# Patient Record
Sex: MALE | Race: BLACK OR AFRICAN AMERICAN | Employment: FULL TIME | URBAN - METROPOLITAN AREA
[De-identification: names, ages, dates, MRNs, and addresses within clinical notes are randomized per-mention and may not be internally consistent; named-entity substitution may affect disease eponyms.]

---

## 2021-04-21 ENCOUNTER — HOSPITAL ENCOUNTER (EMERGENCY)
Age: 41
Discharge: HOME | End: 2021-04-21
Attending: EMERGENCY MEDICINE
Payer: OTHER MISCELLANEOUS

## 2021-04-21 VITALS
TEMPERATURE: 98.1 F | RESPIRATION RATE: 16 BRPM | DIASTOLIC BLOOD PRESSURE: 82 MMHG | WEIGHT: 140 LBS | HEIGHT: 71 IN | SYSTOLIC BLOOD PRESSURE: 127 MMHG | HEART RATE: 89 BPM | BODY MASS INDEX: 19.6 KG/M2 | OXYGEN SATURATION: 96 %

## 2021-04-21 DIAGNOSIS — S80.12XA CONTUSION OF LEFT LOWER EXTREMITY, INITIAL ENCOUNTER: Primary | ICD-10-CM

## 2021-04-21 DIAGNOSIS — S80.812A ABRASION OF LEFT LOWER EXTREMITY, INITIAL ENCOUNTER: ICD-10-CM

## 2021-04-21 PROCEDURE — 99283 EMERGENCY DEPT VISIT LOW MDM: CPT

## 2021-04-21 PROCEDURE — 74011250637 HC RX REV CODE- 250/637: Performed by: EMERGENCY MEDICINE

## 2021-04-21 RX ORDER — IBUPROFEN 400 MG/1
400 TABLET ORAL
Status: COMPLETED | OUTPATIENT
Start: 2021-04-21 | End: 2021-04-21

## 2021-04-21 RX ADMIN — IBUPROFEN 400 MG: 400 TABLET, FILM COATED ORAL at 00:52

## 2021-04-21 NOTE — ED PROVIDER NOTES
EMERGENCY DEPARTMENT HISTORY AND PHYSICAL EXAM      Date: 4/21/2021  Patient Name: Michelle Villela    History of Presenting Illness     Chief Complaint   Patient presents with    Leg Pain       History Provided By: Patient    HPI: Michelle Villela, 47 y.o. male with a past medical history significant No significant past medical history presents to the ED with cc of Left knee pain after someone ran into him with a forklift at work 30 minutes ago. States he had prior injury at same site 2 months ago with normal xrays. Ambulated here, denies other injuries. No focal weakness or numbness. PCP: Ronen García MD    No current facility-administered medications on file prior to encounter. No current outpatient medications on file prior to encounter. Past History     Past Medical History: States tetanus UTD  History reviewed. No pertinent past medical history. Past Surgical History:  History reviewed. No pertinent surgical history. Family History:  History reviewed. No pertinent family history. Social History:  Social History     Tobacco Use    Smoking status: Never Smoker    Smokeless tobacco: Never Used   Substance Use Topics    Alcohol use: Not Currently    Drug use: Yes     Types: Marijuana     Comment: daily       Allergies:  No Known Allergies      Review of Systems   Review of Systems   Cardiovascular: Negative for chest pain. Gastrointestinal: Negative for abdominal pain. Musculoskeletal: Negative for back pain and neck pain. Neurological: Negative for weakness and numbness. Hematological: Does not bruise/bleed easily. All other systems reviewed and are negative. Physical Exam   Physical Exam  Vitals signs and nursing note reviewed. Constitutional:       Appearance: Normal appearance. He is normal weight. HENT:      Head: Normocephalic. Eyes:      General: No scleral icterus. Neck:      Musculoskeletal: Normal range of motion.    Cardiovascular:      Rate and Rhythm: Normal rate and regular rhythm. Pulses: Normal pulses. Pulmonary:      Effort: Pulmonary effort is normal.   Musculoskeletal: Normal range of motion. Comments: Points to lateral, posterior left knee as site of pain. No deformity or contusion, no edema or warmth. No bony tenderness to palpation. Skin:     General: Skin is warm and dry. Comments: Superficial abrasion, proximal lateral left leg. No bleeding. Neurological:      General: No focal deficit present. Mental Status: He is alert. Sensory: No sensory deficit. Motor: No weakness. Gait: Gait normal.   Psychiatric:         Mood and Affect: Mood normal.         Diagnostic Study Results     Labs -   No results found for this or any previous visit (from the past 12 hour(s)). Radiologic Studies -   No orders to display     CT Results  (Last 48 hours)    None        CXR Results  (Last 48 hours)    None            Medical Decision Making   I am the first provider for this patient. I reviewed the vital signs, available nursing notes, past medical history, past surgical history, family history and social history. Vital Signs-Reviewed the patient's vital signs. Patient Vitals for the past 12 hrs:   Temp Pulse Resp BP SpO2   04/21/21 0028 98.1 °F (36.7 °C) 89 16 127/82 96 %       Records Reviewed: Nursing Notes    Provider Notes (Medical Decision Making):   Imp: :LLE abrasion/contusion    Knee immobilizer, ibuprofen, f/u Ortho prn  ED Course:   Initial assessment performed. The patients presenting problems have been discussed, and they are in agreement with the care plan formulated and outlined with them. I have encouraged them to ask questions as they arise throughout their visit. PLAN:  1. There are no discharge medications for this patient.     2.   Follow-up Information     Follow up With Specialties Details Why Contact Abad Pena MD Orthopedic Surgery  [facility] [address]   [id number]  [phone]          Return to ED if worse     Diagnosis     Clinical Impression:   1. Contusion of left lower extremity, initial encounter    2.  Abrasion of left lower extremity, initial encounter